# Patient Record
Sex: MALE | Race: WHITE | NOT HISPANIC OR LATINO | URBAN - NONMETROPOLITAN AREA
[De-identification: names, ages, dates, MRNs, and addresses within clinical notes are randomized per-mention and may not be internally consistent; named-entity substitution may affect disease eponyms.]

---

## 2019-05-10 ENCOUNTER — IMPORTED ENCOUNTER (OUTPATIENT)
Dept: URBAN - NONMETROPOLITAN AREA CLINIC 1 | Facility: CLINIC | Age: 69
End: 2019-05-10

## 2019-05-10 PROBLEM — H04.123: Noted: 2019-05-10

## 2019-05-10 PROBLEM — H25.13: Noted: 2019-05-10

## 2019-05-10 PROBLEM — H52.4: Noted: 2019-05-10

## 2019-05-10 PROCEDURE — 92004 COMPRE OPH EXAM NEW PT 1/>: CPT

## 2019-05-10 PROCEDURE — 92015 DETERMINE REFRACTIVE STATE: CPT

## 2019-05-10 NOTE — PATIENT DISCUSSION
Presbyopia OUDiscussed refractive status in detail with patient. New glasses Rx given today. Continue to monitor. [place] OUDiscussed diagnosis with patient. Reviewed symptoms related to cataract progression. Discussed various treatment options with patient. Recommend cataract evaluation by Dr. Kory Mccord. Patient defers treatment at this time. Continue to monitor. LEOPOLDO OUDiscussed the diagnosis with patient. Discussed the chronic nature and treatment options with the patient. Recommend Systane Complete BID-QID OU. Continue to monitor.

## 2020-06-12 ENCOUNTER — IMPORTED ENCOUNTER (OUTPATIENT)
Dept: URBAN - NONMETROPOLITAN AREA CLINIC 1 | Facility: CLINIC | Age: 70
End: 2020-06-12

## 2020-06-12 PROCEDURE — 92015 DETERMINE REFRACTIVE STATE: CPT

## 2020-06-12 PROCEDURE — 92014 COMPRE OPH EXAM EST PT 1/>: CPT

## 2020-06-12 NOTE — PATIENT DISCUSSION
Presbyopia OUDiscussed refractive status in detail with patient. New glasses Rx given today. Continue to monitor. [place] OUDiscussed diagnosis with patient. Reviewed symptoms related to cataract progression. Discussed various treatment options with patient. Recommend cataract evaluation by Dr. Char Perea. Patient defers treatment at this time. Continue to monitor. LEOPOLDO OUDiscussed the diagnosis with patient. Discussed the chronic nature and treatment options with the patient. Continue Systane Complete BID-QID OU. Samples of Refresh Relieva given today. Continue to monitor.

## 2020-11-03 ENCOUNTER — IMPORTED ENCOUNTER (OUTPATIENT)
Dept: URBAN - NONMETROPOLITAN AREA CLINIC 1 | Facility: CLINIC | Age: 70
End: 2020-11-03

## 2020-11-03 PROCEDURE — 99214 OFFICE O/P EST MOD 30 MIN: CPT

## 2020-11-03 NOTE — PATIENT DISCUSSION
System down 11/3/20 and chart re-entered 11/6/20 PDSPVD OD with pre- retinal heme - Discussed findings of exam in detail with the patient. - The risk of retinal detachment in patients with PVDs was discussed with the patient and the warning signs of retinal detachment were carefully reviewed with the patient. - The patient was warned to return to the office or contact the ophthalmologist on call immediately if they experience signs of retinal detachment. - Pre-retinal Heme noted at 12-1:00 on nerve - Patient states that she is not a diabetic. She takes blood pressure medicine but is under control- Recommend patient seeing [facility]. Patient agrees with plan- Continue to montior ------------------------------------previous notes--------------------------------Presbyopia OUDiscussed refractive status in detail with patient. Continue to monitor. [place] OUDiscussed diagnosis with patient. Reviewed symptoms related to cataract progression. Discussed various treatment options with patient. Recommend cataract evaluation by Dr. Vijay James. Patient defers treatment at this time. Continue to monitor. LEOPOLDO OUDiscussed the diagnosis with patient. Discussed the chronic nature and treatment options with the patient. Continue Systane Complete BID-QID OU. Samples of Refresh Relieva given today. Continue to monitor.

## 2020-11-06 PROBLEM — H04.123: Noted: 2020-11-06

## 2020-11-06 PROBLEM — H52.4: Noted: 2020-11-06

## 2020-11-06 PROBLEM — H43.811: Noted: 2020-11-06

## 2020-11-06 PROBLEM — H35.61: Noted: 2020-11-06

## 2020-11-06 PROBLEM — H25.13: Noted: 2020-11-06

## 2021-06-14 ENCOUNTER — IMPORTED ENCOUNTER (OUTPATIENT)
Dept: URBAN - NONMETROPOLITAN AREA CLINIC 1 | Facility: CLINIC | Age: 71
End: 2021-06-14

## 2021-06-14 PROBLEM — H43.813: Noted: 2021-06-14

## 2021-06-14 PROBLEM — H25.813: Noted: 2021-06-14

## 2021-06-14 PROBLEM — H52.4: Noted: 2021-06-14

## 2021-06-14 PROBLEM — H04.123: Noted: 2021-06-14

## 2021-06-14 PROCEDURE — 92014 COMPRE OPH EXAM EST PT 1/>: CPT

## 2021-06-14 PROCEDURE — 92015 DETERMINE REFRACTIVE STATE: CPT

## 2021-06-14 NOTE — PATIENT DISCUSSION
Presbyopia OUDiscussed refractive status in detail with patient. MR done today but do not recommend updating due to cataract progression. Continue to monitor. Combined Cataract OUDiscussed diagnosis with patient. Reviewed symptoms related to cataract progression. Discussed various treatment options with patient. Recommend cataract evaluation by Dr. Yang Collins. Patient elects to schedule. LEOPOLDO/Alergies OUDiscussed the diagnosis with patient. Discussed the chronic nature and treatment options with the patient. Continue Systane Complete BID-QID OU. Start Pataday QD OU X 2 weeks then PRN. Continue to monitor. PVD OUDiscussed findings of exam in detail with the patient. The risk of retinal detachment in patients with PVDs was discussed with the patient and the warning signs of retinal detachment were carefully reviewed with the patient. The patient was warned to return to the office or contact the ophthalmologist on call immediately if they experience signs of retinal detachment. Continue to monitor.

## 2021-08-18 ENCOUNTER — IMPORTED ENCOUNTER (OUTPATIENT)
Dept: URBAN - NONMETROPOLITAN AREA CLINIC 1 | Facility: CLINIC | Age: 71
End: 2021-08-18

## 2021-08-18 PROBLEM — H52.4: Noted: 2021-08-18

## 2021-08-18 PROBLEM — H43.813: Noted: 2021-06-14

## 2021-08-18 PROBLEM — H04.123: Noted: 2021-06-14

## 2021-08-18 PROBLEM — H25.813: Noted: 2021-08-18

## 2021-08-18 PROCEDURE — 92014 COMPRE OPH EXAM EST PT 1/>: CPT

## 2021-08-18 NOTE — PATIENT DISCUSSION
Cataract(s)-Visually significant cataract OU .-Cataract(s) causing symptomatic impairment of visual function not correctable with a tolerable change in glasses or contact lenses lighting or non-operative means resulting in specific activity limitations and/or participation restrictions including but not limited to reading viewing television driving or meeting vocational or recreational needs. -Expectation is clearer vision and functional improvement in symptoms as well as reduced glare disability after cataract removal.-Order IOLMaster and OPD today. -Recommend Toric IOL   /   Limbal Relaxing Incisions based on today's OPD testing and lifestyle questionnaire.-All questions were answered regarding surgery including pre and post-op medications appointments activity restrictions and anesthetic usage.-The risks benefits and alternatives and special risk factors for the patient were discussed in detail including but not limited to: bleeding infection retinal detachment vitreous loss problems with the implant and possible need for additional surgery.-Although rare the possibility of complete vision loss was discussed.-The possible need for glasses post-operatively was discussed.-Order medical clearance exam based on history of HBP and high cholesterol -Patient elects to proceed with cataract surgery OS . Will schedule at patient's convenience and re-evaluate OD  in the future. Discussed lensQualifies for Toric OD and LRI OS discussed lens benefits and advised pt he WILL need glasses for reading b/c we are correcting his [place] for distance. Pt expressed understanding. Pt elects Trad Sx.  Post op inflammation anticipated discussed dextenza insertion after surgery

## 2021-09-27 ENCOUNTER — IMPORTED ENCOUNTER (OUTPATIENT)
Dept: URBAN - NONMETROPOLITAN AREA CLINIC 1 | Facility: CLINIC | Age: 71
End: 2021-09-27

## 2021-09-27 PROBLEM — Z01.818: Noted: 2021-09-27

## 2021-09-27 PROBLEM — I10: Noted: 2021-09-27

## 2021-09-27 PROBLEM — K21.9: Noted: 2021-09-27

## 2021-09-27 PROBLEM — E78.5: Noted: 2021-09-27

## 2021-09-27 PROBLEM — F32.9: Noted: 2021-09-27

## 2021-09-27 PROCEDURE — 99214 OFFICE O/P EST MOD 30 MIN: CPT

## 2021-10-12 ENCOUNTER — IMPORTED ENCOUNTER (OUTPATIENT)
Dept: URBAN - NONMETROPOLITAN AREA CLINIC 1 | Facility: CLINIC | Age: 71
End: 2021-10-12

## 2021-10-12 PROCEDURE — 0356T INSERTION OF DRUG-ELUTING IMPLANT (INCLUDING PUNCTAL DILATION AND IMPLANT REMOVAL WHEN PERFORMED) INTO LACRIMAL CANALICULUS, EACH: CPT

## 2021-10-12 PROCEDURE — 66984 XCAPSL CTRC RMVL W/O ECP: CPT

## 2021-10-12 PROCEDURE — 92136 OPHTHALMIC BIOMETRY: CPT

## 2021-10-12 PROCEDURE — 99024 POSTOP FOLLOW-UP VISIT: CPT

## 2021-10-12 PROCEDURE — 66999 UNLISTED PX ANT SEGMENT EYE: CPT

## 2021-10-12 NOTE — PATIENT DISCUSSION
Cataract(s)-Visually significant cataract OD . -Cataract(s) causing symptomatic impairment of visual function not correctable with a tolerable change in glasses or contact lenses lighting or non-operative means resulting in specific activity limitations and/or participation restrictions including but not limited to reading viewing television driving or meeting vocational or recreational needs. -Expectation is clearer vision and functional improvement in symptoms as well as reduced glare disability after cataract removal.-Recommend LRI based on previous OPD testing and lifestyle questionnaire.-All questions were answered regarding surgery including pre and post-op medications appointments activity restrictions and anesthetic usage.-The risks benefits and alternatives and special risk factors for the patient were discussed in detail including but not limited to: bleeding infection retinal detachment vitreous loss problems with the implant and possible need for additional surgery.-Although rare the possibility of complete vision loss was discussed.-The need for glasses post-operatively was discussed.-Patient elects to proceed with cataract surgery OD . Will schedule at patient's convenience. s/p PCIOL Same Day POV CE OS LRI + Dextenza -Pt doing well s/p PCIOL. -Continue post-op gtts according to instruction sheet and sleep with eye shield over eye for 7 nights.-Avoid bending at the waist lifting anything over 5lbs and dirty or suzi environments. -IOP high  reduced fluid at slit lamp.

## 2021-10-19 PROBLEM — H25.811: Noted: 2021-10-19

## 2021-10-19 PROBLEM — Z98.42: Noted: 2021-10-19

## 2021-10-20 ENCOUNTER — IMPORTED ENCOUNTER (OUTPATIENT)
Dept: URBAN - NONMETROPOLITAN AREA CLINIC 1 | Facility: CLINIC | Age: 71
End: 2021-10-20

## 2021-10-20 PROBLEM — Z98.42: Noted: 2021-10-19

## 2021-10-20 PROBLEM — Z98.41: Noted: 2021-10-20

## 2021-10-20 PROCEDURE — 99024 POSTOP FOLLOW-UP VISIT: CPT

## 2021-10-20 NOTE — PATIENT DISCUSSION
1-Day POV CE OD 10/19/21 Toric/Trad + Dextenza CE OS 10/12/21 LRI/Trad + Dextenza- Discussed diagnosis in detail with patient- Patient is stable and doing well- Wound intact- Continue all post op drops as directed- Recommend +2.00 /+2.25 readers - Continue to monitor- RTC 1 week POV with Dr. Eamon Barger

## 2021-10-25 ENCOUNTER — IMPORTED ENCOUNTER (OUTPATIENT)
Dept: URBAN - NONMETROPOLITAN AREA CLINIC 1 | Facility: CLINIC | Age: 71
End: 2021-10-25

## 2021-10-25 PROCEDURE — 99024 POSTOP FOLLOW-UP VISIT: CPT

## 2021-10-25 NOTE — PATIENT DISCUSSION
1-week POV CE OD 10/19/21 Toric/Trad + Dextenza CE OS 10/12/21 LRI/Trad + Dextenza- Discussed diagnosis in detail with patient- Patient is stable and doing well- Wound intact- Continue all post op drops as directed- Recommend +2.00 /+2.25 readers - Continue to monitor- RTC 3 months for follow up with [facility]

## 2021-11-30 ENCOUNTER — IMPORTED ENCOUNTER (OUTPATIENT)
Dept: URBAN - NONMETROPOLITAN AREA CLINIC 1 | Facility: CLINIC | Age: 71
End: 2021-11-30

## 2021-11-30 PROBLEM — Z98.41: Noted: 2021-11-30

## 2021-11-30 PROBLEM — Z98.42: Noted: 2021-11-30

## 2021-11-30 PROBLEM — H16.223: Noted: 2021-11-30

## 2021-11-30 PROCEDURE — 99213 OFFICE O/P EST LOW 20 MIN: CPT

## 2021-11-30 NOTE — PATIENT DISCUSSION
LEOPOLDO OU-  Discussed findings w/ pt today-  Signs/symptoms associated discussed including burning/irritation-  Start Ketorolac OU BID-TID x1-2 weeks then PRN use-  Continue to monitor PRN changesPseudophakia OU (OD:  10/19/21 Toric +Dextenza; OS:  10/12/21 LRI +Dextenza)- Discussed diagnosis in detail with patient- Patient is stable and doing well- Wound intact OU- Continue all post op drops as directed- Recommend +2.00 /+2.25 readers - Continue to monitor- RTC 3 months for follow up with [facility]

## 2022-02-11 ENCOUNTER — PREPPED CHART (OUTPATIENT)
Dept: URBAN - NONMETROPOLITAN AREA CLINIC 1 | Facility: CLINIC | Age: 72
End: 2022-02-11

## 2022-02-11 ENCOUNTER — FOLLOW UP (OUTPATIENT)
Dept: URBAN - NONMETROPOLITAN AREA CLINIC 1 | Facility: CLINIC | Age: 72
End: 2022-02-11

## 2022-02-11 DIAGNOSIS — Z96.1: ICD-10-CM

## 2022-02-11 PROCEDURE — 99213 OFFICE O/P EST LOW 20 MIN: CPT

## 2022-02-11 ASSESSMENT — VISUAL ACUITY
OD_SC: 20/20
OS_SC: 20/30

## 2022-02-11 ASSESSMENT — TONOMETRY
OD_IOP_MMHG: 16
OS_IOP_MMHG: 16

## 2022-02-11 NOTE — PATIENT DISCUSSION
Discussed diagnosis in detail with patient. Signs/symptoms discussed with patient including tearing, fb sensation. Patient has been using Refresh and Systane with no relief. Start Cequa BID OU, sample given today and Rx sent to pharmacy. Patient to call if no improvement. Continue to monitor.

## 2022-04-09 ASSESSMENT — TONOMETRY
OD_IOP_MMHG: 14
OD_IOP_MMHG: 16
OD_IOP_MMHG: 22
OS_IOP_MMHG: 14
OD_IOP_MMHG: 16
OS_IOP_MMHG: 18
OD_IOP_MMHG: 18
OS_IOP_MMHG: 18
OS_IOP_MMHG: 16
OD_IOP_MMHG: 19
OS_IOP_MMHG: 18
OS_IOP_MMHG: 19
OS_IOP_MMHG: 16
OS_IOP_MMHG: 14
OD_IOP_MMHG: 16
OD_IOP_MMHG: 18
OD_IOP_MMHG: 20
OS_IOP_MMHG: 23

## 2022-04-09 ASSESSMENT — VISUAL ACUITY
OD_CC: 20/20
OU_CC: 20/20
OD_SC: 20/20
OD_CC: 20/20
OS_CC: 20/50
OS_GLARE: 20/70
OS_SC: 20/30+
OD_GLARE: 20/50
OD_GLARE: 20/50
OS_CC: 20/400
OS_SC: 20/30
OS_CC: 20/20-
OD_GLARE: 20/50
OD_GLARE: 20/50
OS_CC: 20/20
OS_SC: 20/25+2
OD_SC: 20/30
OD_SC: 20/30-
OS_GLARE: 20/70
OD_CC: 20/30-
OD_SC: 20/20
OD_CC: 20/30
OD_PAM: 20/20
OS_PH: 20/30-
OS_AM: 20/25+2
OD_PAM: 20/20
OS_SC: 20/30
OD_PH: 20/50-
OS_SC: 20/20-1
OD_SC: 20/20
OS_CC: 20/25-1
OD_CC: 20/400
OS_CC: 20/40+
OS_GLARE: 20/70

## 2023-07-27 NOTE — PATIENT DISCUSSION
Medical Clearance-Medical clearance done today. -No outstanding concerns that would preclude surgery.-Patient is cleared to proceed with scheduled surgery. Telemetry

## 2024-02-19 ENCOUNTER — COMPREHENSIVE EXAM (OUTPATIENT)
Dept: URBAN - NONMETROPOLITAN AREA CLINIC 1 | Facility: CLINIC | Age: 74
End: 2024-02-19

## 2024-02-19 DIAGNOSIS — H16.223: ICD-10-CM

## 2024-02-19 DIAGNOSIS — H43.813: ICD-10-CM

## 2024-02-19 DIAGNOSIS — H10.423: ICD-10-CM

## 2024-02-19 PROCEDURE — 99214 OFFICE O/P EST MOD 30 MIN: CPT

## 2024-02-19 PROCEDURE — 92250 FUNDUS PHOTOGRAPHY W/I&R: CPT

## 2024-02-19 ASSESSMENT — TONOMETRY
OS_IOP_MMHG: 14
OD_IOP_MMHG: 14

## 2024-02-19 ASSESSMENT — VISUAL ACUITY
OS_SC: 20/25
OU_SC: 20/25-2
OD_SC: 20/25-1

## 2025-02-13 ENCOUNTER — COMPREHENSIVE EXAM (OUTPATIENT)
Age: 75
End: 2025-02-13

## 2025-02-13 DIAGNOSIS — H52.4: ICD-10-CM

## 2025-02-13 PROCEDURE — 92015 DETERMINE REFRACTIVE STATE: CPT

## 2025-02-13 PROCEDURE — 92014 COMPRE OPH EXAM EST PT 1/>: CPT
